# Patient Record
(demographics unavailable — no encounter records)

---

## 2024-10-21 NOTE — REASON FOR VISIT
Show Applicator Variable?: Yes Post-Care Instructions: I reviewed with the patient in detail post-care instructions. Patient is to wear sunprotection, and avoid picking at any of the treated lesions. Pt may apply Vaseline to crusted or scabbing areas.  May apply bandaid if desired. Duration Of Freeze Thaw-Cycle (Seconds): 0 Render Note In Bullet Format When Appropriate: No Application Tool (Optional): Liquid Nitrogen Sprayer Number Of Freeze-Thaw Cycles: 1 freeze-thaw cycle [Annual Wellness Visit] : an annual wellness visit Consent: The patient's consent was obtained including but not limited to risks of crusting, scabbing, blistering, scarring, darker or lighter pigmentary change, recurrence, incomplete removal and infection. Detail Level: Detailed

## 2024-10-23 NOTE — HISTORY OF PRESENT ILLNESS
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

## 2024-10-23 NOTE — HEALTH RISK ASSESSMENT
[With Significant Other] : lives with significant other [] :  [Fully functional (bathing, dressing, toileting, transferring, walking, feeding)] : Fully functional (bathing, dressing, toileting, transferring, walking, feeding) [Fully functional (using the telephone, shopping, preparing meals, housekeeping, doing laundry, using] : Fully functional and needs no help or supervision to perform IADLs (using the telephone, shopping, preparing meals, housekeeping, doing laundry, using transportation, managing medications and managing finances) [Never] : Never [NO] : No [With Patient/Caregiver] : , with patient/caregiver [Designated Healthcare Proxy] : Designated healthcare proxy [Name: ___] : Health Care Proxy's Name: [unfilled]  [Relationship: ___] : Relationship: [unfilled] [Good] : ~his/her~  mood as  good [Yes] : Yes [4 or more  times a week (4 pts)] : 4 or more  times a week (4 points) [1 or 2 (0 pts)] : 1 or 2 (0 points) [Never (0 pts)] : Never (0 points) [No] : In the past 12 months have you used drugs other than those required for medical reasons? No [No falls in past year] : Patient reported no falls in the past year [None] : None [Graduate School] : graduate school [Reports normal functional visual acuity (ie: able to read med bottle)] : Reports normal functional visual acuity [Audit-CScore] : 4 [Change in mental status noted] : No change in mental status noted [Reports changes in hearing] : Reports no changes in hearing [Reports changes in vision] : Reports no changes in vision [AdvancecareDate] : 10/23/2023

## 2024-10-23 NOTE — PHYSICAL EXAM
[No Acute Distress] : no acute distress [Well-Appearing] : well-appearing [Normal Sclera/Conjunctiva] : normal sclera/conjunctiva [No Respiratory Distress] : no respiratory distress  [Clear to Auscultation] : lungs were clear to auscultation bilaterally [Normal Rate] : normal rate  [Regular Rhythm] : with a regular rhythm [Soft] : abdomen soft [Non Tender] : non-tender

## 2024-10-23 NOTE — ASSESSMENT
[FreeTextEntry1] : 72 y/o male  hx Melanoma s/p excisions regular derm follow up  Afib: non valvular, paroxysmal Sinus today, rate normal DCCV in the past Xarelto 20 daily atenolol  Flecainide 150 bid Dr. Villanueva in past Upcoming appt with Cangello  HTN: near goal Amlodipine 2.5 (had been decreased from 5mg by Dr. Rangel at last visit) Atenolol 25mg bid - unusual choice  HLD: Atorva 20 LDL 85 in April    HCM: Metabolic screening today Hep C scree PSA agees Vaccines: Pneumococcal Vaccines completed eligible for Shingrix -  Recommended seasonal Flu , will get at pharmacy.   COVID vaccine in future CRC Screening: CLS in 8/2019 NOrmal, repeat in 2029

## 2024-10-23 NOTE — REVIEW OF SYSTEMS
[Dysuria] : no dysuria [Incontinence] : no incontinence [Hesitancy] : no hesitancy [Nocturia] : no nocturia [Frequency] : no frequency

## 2024-10-23 NOTE — HISTORY OF PRESENT ILLNESS
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

## 2024-10-28 NOTE — DISCUSSION/SUMMARY
[Patient] : the patient [EKG obtained to assist in diagnosis and management of assessed problem(s)] : EKG obtained to assist in diagnosis and management of assessed problem(s) [___ Month(s)] : in [unfilled] month(s) [FreeTextEntry1] : 72 y/o male with h/o htn, hl, afib s/p dccv, aflutter, overweight who presents for initial evaluation today  -continue norvasc - increase to 5 mg qd -continue asa, statin -continue xarelto -EP f/up for afib -continue atenolol, flecainide -CTA cor ordered given abnormal stress echo, on flecainide -labs 2024 reviewed, needs LpA -ekg ordered - SB, 1st avb, no st/t changes  -Stress Echo 2/24:  1. Normal exercise stress echocardiogram.  2. Abnormal exercise electrocardiography.  3. Positive ECG evidence of exercise ischemia at or near maximal predicted heart rate.  4. Negative for angina or the patient's characteristic chest pain.  5. Physiologic blood pressure response to exercise.  6. Good functional capacity.  7. Compared to the previous stress echocardiogram performed on 7/10/2020, there has been no significant interval changes in exercise capacity and imaging.  -Holter 2/24: SR, avg hr 57, 1st avb, rare pac's, pvc's -Echo 2022: normal lv/rv size/fxn, ef 60-65%, no wma, mildly dilated la, trace mr/tr/pr -counseled on cvd risk factors  -f/up 3 weeks for bp  I have spent 60 minutes reviewing labs, records, tests and discussed cvd risk factors, htn, hl

## 2024-10-28 NOTE — HISTORY OF PRESENT ILLNESS
[FreeTextEntry1] : 72 y/o male with h/o htn, hl, afib s/p dccv, aflutter, overweight who presents for initial evaluation today   no cp, sob, syncope, lh, palpitations, edema, orthopnea, pnd  -Stress Echo :  1. Normal exercise stress echocardiogram.  2. Abnormal exercise electrocardiography.  3. Positive ECG evidence of exercise ischemia at or near maximal predicted heart rate.  4. Negative for angina or the patient's characteristic chest pain.  5. Physiologic blood pressure response to exercise.  6. Good functional capacity.  7. Compared to the previous stress echocardiogram performed on 7/10/2020, there has been no significant interval changes in exercise capacity and imaging.  -Holter : SR, avg hr 57, 1st avb, rare pac's, pvc's -Echo : normal lv/rv size/fxn, ef 60-65%, no wma, mildly dilated la, trace mr/tr/pr  seen by Dr. Villanueva - on flecainide  walks at gym once/twice per week, plays squash diet healthy stress low sleeps 7 hours  PMH/PSH: htn hl 1st avb afib s/p dccv overweight melanoma removal  aflutter  ALL: pcn  MEDS: norvasc 2.5 mg qd atenolol 25 mg bid lipitor 20 mg qhs flecainide 150 mg bid xarelto 20 mg qd  SH: no tobacco social etoh no drugs  retired worked for [employer] no children from [place]  FH: mother - brain tumor,  60's father - AAA 70,  MVA 80 sister - alive, 70's, healthy

## 2024-12-02 NOTE — HISTORY OF PRESENT ILLNESS
[FreeTextEntry1] : 72 y/o male with h/o cad, htn, hl, afib s/p dccv, aflutter, overweight who presents for f/up today  last seen 10/24 norvasc increased to 5, lipitor increased to 40  -CTA cor : Cardiac: 1.  The calcium score is moderate at 167 Agatston units, which is at the 50 percentile, adjusted for age, gender and race. 2.  Minimal stenotic multivessel coronary artery disease. Non-cardiac: Partially imaged bilateral dependent subpleural groundglass and reticular opacities may reflect dependent atelectasis versus nonspecific interstitial lung abnormalities.  no cp, sob, syncope, lh, palpitations, edema, orthopnea, pnd  -Stress Echo : 1. Normal exercise stress echocardiogram. 2. Abnormal exercise electrocardiography. 3. Positive ECG evidence of exercise ischemia at or near maximal predicted heart rate. 4. Negative for angina or the patient's characteristic chest pain. 5. Physiologic blood pressure response to exercise. 6. Good functional capacity. 7. Compared to the previous stress echocardiogram performed on 7/10/2020, there has been no significant interval changes in exercise capacity and imaging.  -Holter : SR, avg hr 57, 1st avb, rare pac's, pvc's  -Echo : normal lv/rv size/fxn, ef 60-65%, no wma, mildly dilated la, trace mr/tr/pr  seen by Dr. Villanueva - on flecainide  walks at gym once/twice per week, plays squash diet healthy stress low sleeps 7 hours  PMH/PSH: htn hl 1st avb afib s/p dccv overweight melanoma removal aflutter cad  ALL: pcn  MEDS: norvasc 5 mg qd atenolol 25 mg bid lipitor 40 mg qhs flecainide 150 mg bid xarelto 20 mg qd  SH: no tobacco social etoh no drugs  retired worked for [employer] no children from [place]  FH: mother - brain tumor,  60's father - AAA 70,  MVA 80 sister - alive, 70's, healthy

## 2024-12-02 NOTE — DISCUSSION/SUMMARY
[Patient] : the patient [___ Month(s)] : in [unfilled] month(s) [FreeTextEntry1] : 72 y/o male with h/o cad, htn, hl, afib s/p dccv, aflutter, overweight who presents for initial evaluation today  -continue norvasc   -continue asa, statin -continue xarelto -EP f/up for afib, he should d/w EP flecainide given new diagnosis of CAD -continue atenolol, flecainide -CTA cor 11/24: Cardiac: 1.  The calcium score is moderate at 167 Agatston units, which is at the 50 percentile, adjusted for age, gender and race. 2.  Minimal stenotic multivessel coronary artery disease. Non-cardiac: Partially imaged bilateral dependent subpleural groundglass and reticular opacities may reflect dependent atelectasis versus nonspecific interstitial lung abnormalities. -pulm referral for lung changes on CTA cor -labs 2024 reviewed -ekg 10/24 - SB, 1st avb, no st/t changes -Stress Echo 2/24:  1. Normal exercise stress echocardiogram.  2. Abnormal exercise electrocardiography.  3. Positive ECG evidence of exercise ischemia at or near maximal predicted heart rate.  4. Negative for angina or the patient's characteristic chest pain.  5. Physiologic blood pressure response to exercise.  6. Good functional capacity.  7. Compared to the previous stress echocardiogram performed on 7/10/2020, there has been no significant interval changes in exercise capacity and imaging.  -Holter 2/24: SR, avg hr 57, 1st avb, rare pac's, pvc's -Echo 2022: normal lv/rv size/fxn, ef 60-65%, no wma, mildly dilated la, trace mr/tr/pr -counseled on cvd risk factors -f/up 3 months for lipids  I have spent 30 minutes reviewing labs, records, tests and discussed cvd risk factors, htn, hl.

## 2024-12-20 NOTE — PHYSICAL EXAM
[Well Developed] : well developed [Well Nourished] : well nourished [No Acute Distress] : no acute distress [Normal Venous Pressure] : normal venous pressure [No Carotid Bruit] : no carotid bruit [Normal S1, S2] : normal S1, S2 [No Murmur] : no murmur [No Rub] : no rub [No Gallop] : no gallop [Clear Lung Fields] : clear lung fields [Good Air Entry] : good air entry [No Respiratory Distress] : no respiratory distress  [Soft] : abdomen soft [Non Tender] : non-tender [No Masses/organomegaly] : no masses/organomegaly [Normal Bowel Sounds] : normal bowel sounds [Normal Gait] : normal gait [No Edema] : no edema [No Cyanosis] : no cyanosis [No Clubbing] : no clubbing [No Varicosities] : no varicosities [No Rash] : no rash [No Skin Lesions] : no skin lesions [Moves all extremities] : moves all extremities [No Focal Deficits] : no focal deficits [Normal Speech] : normal speech [Alert and Oriented] : alert and oriented [Normal memory] : normal memory [General Appearance - Well Developed] : well developed [Normal Appearance] : normal appearance [Well Groomed] : well groomed [General Appearance - Well Nourished] : well nourished [No Deformities] : no deformities [General Appearance - In No Acute Distress] : no acute distress [Normal Conjunctiva] : the conjunctiva exhibited no abnormalities [Eyelids - No Xanthelasma] : the eyelids demonstrated no xanthelasmas [Normal Oral Mucosa] : normal oral mucosa [No Oral Pallor] : no oral pallor [No Oral Cyanosis] : no oral cyanosis [Normal Jugular Venous A Waves Present] : normal jugular venous A waves present [Normal Jugular Venous V Waves Present] : normal jugular venous V waves present [No Jugular Venous Morillo A Waves] : no jugular venous morillo A waves [Respiration, Rhythm And Depth] : normal respiratory rhythm and effort [Exaggerated Use Of Accessory Muscles For Inspiration] : no accessory muscle use [Auscultation Breath Sounds / Voice Sounds] : lungs were clear to auscultation bilaterally [Heart Rate And Rhythm] : heart rate and rhythm were normal [Heart Sounds] : normal S1 and S2 [Murmurs] : no murmurs present [Abdomen Soft] : soft [Abdomen Tenderness] : non-tender [Abdomen Mass (___ Cm)] : no abdominal mass palpated [Abnormal Walk] : normal gait [Gait - Sufficient For Exercise Testing] : the gait was sufficient for exercise testing [Nail Clubbing] : no clubbing of the fingernails [Cyanosis, Localized] : no localized cyanosis [Petechial Hemorrhages (___cm)] : no petechial hemorrhages [Skin Color & Pigmentation] : normal skin color and pigmentation [] : no rash [No Venous Stasis] : no venous stasis [Skin Lesions] : no skin lesions [No Skin Ulcers] : no skin ulcer [No Xanthoma] : no  xanthoma was observed [Oriented To Time, Place, And Person] : oriented to person, place, and time [Affect] : the affect was normal [Mood] : the mood was normal [No Anxiety] : not feeling anxious

## 2024-12-20 NOTE — CARDIOLOGY SUMMARY
[___] : [unfilled] [LVEF ___%] : LVEF [unfilled]% [Normal] : normal LA size [None] : no mitral regurgitation [de-identified] : 12/20/24: sinsu bradycardia @ 59 bpm  8/8/23 SB with first deg AVB  7/19/22 SB Jeffrey = 274  [de-identified] : 7/10/20 Normal structure and function

## 2024-12-20 NOTE — HISTORY OF PRESENT ILLNESS
[FreeTextEntry1] : 72 y/o M, with PMHX of atrial fibrillation s/p DCCV 6 years ago and started on Flecainide/ASA, lucrecia, who presents from Dr. Brandt's office to discuss flecainide with his positive CTA.  He generally feels well overall. He checks his pulse and does not feel he has been in afib recently. He denies chest pain, SOB, VILLANUEVA, palpitation, light headedness, dizziness, syncope or change in exertional capacity.

## 2024-12-20 NOTE — DISCUSSION/SUMMARY
[EKG obtained to assist in diagnosis and management of assessed problem(s)] : EKG obtained to assist in diagnosis and management of assessed problem(s) [FreeTextEntry1] : 70 y/o M, with PMHX of atrial fibrillation s/p DCCV 6 years ago and started on Flecainide/ASA, lucrecia, who presents from Dr. Brandt's office to discuss flecainide with his positive CTA.   1) Atrial Fibrillation  - He is maintaining NSR. He has been on flecainide for the past few years - Given CAD on CTA, we catherine switch him to multaq 400 mg twice daily.  - He should continue his atenolol 25 mg twice daily.  - Advised that an ablation remains an option if he has difficulty maintaining NSR without flecainide.   2) Stroke Risk  -  -CHADsVasc score of 3 - Continue Eliquis 5 mg twice daily - Tolerating well, no signs of bleeding  Patient to f/u in 6 months or sooner if he has any questions or concerns

## 2024-12-20 NOTE — ADDENDUM
[FreeTextEntry1] :   I, Franchesca Casarez, am scribing for and the presence of Dr. Fiorella Ramos the following sections: HPI, PMH,Family/social history, ROS, Physical Exam, Assessment / Plan.  I, Fiorella Ramos, hereby attest that the medical record entry for this patient accurately reflects signatures/notations that I made on the Date of Service in my capacity as an Attending Physician when I treated/diagnosed the above patient. I do hereby attest that this information is true, accurate and complete to the best of my knowledge.  I was present for the entire visit and supervised the entire visit including assessing the patient, conducting exam and establishing the plan of care.  I personally performed the evaluation and management services and agree with the plan as outlined above.

## 2025-02-18 NOTE — HISTORY OF PRESENT ILLNESS
[Never] : never [TextBox_4] : 71 year old male comes in for initial consultation: Pt has h/o Afib 15 yrs ago. Well controlled. In Nov 2024, he had a coronary CT done. No dyspnea on exertion. He is able to climb 4-6 flights of stairs currently. Exercises daily 45 min. Exercises are light.  No cough, no wheezing, no nocturnal awakening. H/O snoring as per patient's wife. Never a cigarette smoker. Smoked cigars 2-3/week for 5-6 yrs. 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 His father was a lifetime smoker [TextBox_29] : cigar smoking history

## 2025-02-18 NOTE — PROCEDURE
[FreeTextEntry1] : Coronary CT 11/18/24  IMPRESSION: Cardiac: 1. The calcium score is moderate at 167 Agatston units, which is at the 50 percentile, adjusted for age, gender and race. 2. Minimal stenotic multivessel coronary artery disease. Non-cardiac: Partially imaged bilateral dependent subpleural groundglass and reticular opacities may reflect dependent atelectasis versus nonspecific interstitial lung abnormalities.

## 2025-02-18 NOTE — ASSESSMENT
[FreeTextEntry1] : 2-18-25  It was a pleasure meeting Kenneth in consultation today. His respiratory issues are summarized:  1. Ground glass opacities Coronary CT done on 11/18/24 shows some of the airways are dilated and thickened. There is a focal ground glass opacity in the lingular segment, bronchocentric, in the subpleural area. There is subpleural ground glass opacity with some minimal reticulation seen in the left lower lobe close the fissure.  the lungs are only partially viewed so the ground glass opacities appear to be more on the left side, which we visualize more on this type of CT. We need to get a dedicated chest CT to evaluate the entire lung fields and determine if this would fit the criteria for interstitial lung abnormality. He denies joint stiffness, family history of collagen vascular disease Even though he was close to the [facility] during the time of collapse, he was not under the cloud and did not return for 1 year. This would not be classified as inhalational exposure.   Plan: We will check autoimmune serologies to evaluate for collagen vascular disease PFT, 6MWT to evaluate baseline lung function and SpO2 while he is walking Dedicated chest CT w/o contrast  2. Tracheobronchomalacia It is very likely based upon the triangular shape of the trachea. He has no symptoms, can walk 5-6 flights of stairs without getting short of breath. However, we would like to get a dynamic CT to evaluate for tracheobronchomalacia.  3. Evaluate for obstructive sleep apnea He has a cardiac history, atrial fibrillation. We will order an in-lab diagnostic sleep study.  Return to clinic: 3 months

## 2025-02-18 NOTE — PHYSICAL EXAM
[TextBox_68] : good air entry bilaterally, no wheezes or stridor. Harsh breath sounds upon exhalation [TextBox_105] : 1+ pitting edema, equal bilaterally

## 2025-02-18 NOTE — ADDENDUM
[FreeTextEntry1] :    I, Dr. Emmett Sierra, personally performed the evaluation and management services for this patient who presents  today as a new consultative visit. I personally performed the services described in the documentation, reviewed the documentation recorded by the scribe in my presence and it accurately and completely records my words and actions     Ms. Keyla Bahena acted as a scribe in writing the note that was dictated by me.           I spent  a total of   60 minutes evaluating the patient today. This includes spending 10 min on reviewing the patient's serial performed investigations, discussing today's PFTs, the causes of symptoms, the need to get further investigations, shared decision making regarding follow up.     The remaining time was spent with the patient in discussing health maintenance, benefits of an active life style and avoidance of inhalational exposure     This time does not include performance of any procedures such as PFTs

## 2025-02-18 NOTE — ASSESSMENT
[FreeTextEntry1] : 2-18-25  It was a pleasure meeting Kenneth in consultation today. His respiratory issues are summarized:  1. Ground glass opacities Coronary CT done on 11/18/24 shows some of the airways are dilated and thickened. There is a focal ground glass opacity in the lingular segment, bronchocentric, in the subpleural area. There is subpleural ground glass opacity with some minimal reticulation seen in the left lower lobe close the fissure.  the lungs are only partially viewed so the ground glass opacities appear to be more on the left side, which we visualize more on this type of CT. We need to get a dedicated chest CT to evaluate the entire lung fields and determine if this would fit the criteria for interstitial lung abnormality. He denies joint stiffness, family history of collagen vascular disease Even though he was close to the [facility] during the time of collapse, he was not under the cloud and did not return for 1 year. This would not be classified as inhalational exposure.   Plan: We will check autoimmune serologies to evaluate for collagen vascular disease PFT, 6MWT to evaluate baseline lung function and SpO2 while he is walking Dedicated chest CT w/o contrast  2. Tracheobronchomalacia It is very likely based upon the triangular shape of the trachea. He has no symptoms, can walk 5-6 flights of stairs without getting short of breath. However, we would like to get a dynamic CT to evaluate for tracheobronchomalacia.  3. Evaluate for obstructive sleep apnea He has a cardiac history, atrial fibrillation. We will order an in-lab diagnostic sleep study.  Return to clinic: 3 months

## 2025-02-18 NOTE — ASSESSMENT
[FreeTextEntry1] : 2-18-25  It was a pleasure meeting Kenneth in consultation today. His respiratory issues are summarized:  1. Ground glass opacities Coronary CT done on 11/18/24 shows some of the airways are dilated and thickened. There is a focal ground glass opacity in the lingular segment, bronchocentric, in the subpleural area. There is subpleural ground glass opacity with some minimal reticulation seen in the left lower lobe close the fissure.  the lungs are only partially viewed so the ground glass opacities appear to be more on the left side, which we visualize more on this type of CT. We need to get a dedicated chest CT to evaluate the entire lung fields and determine if this would fit the criteria for interstitial lung abnormality. He denies joint stiffness, family history of collagen vascular disease Even though he was close to the [facility] during the time of collapse, he was not under the cloud and did not return for 1 year. This would not be classified as inhalational exposure.   Plan: We will check autoimmune serologies to evaluate for collagen vascular disease PFT, 6MWT to evaluate baseline lung function and SpO2 while he is walking Dedicated chest CT w/o contrast  2. Tracheobronchomalacia It is very likely based upon the triangular shape of the trachea. He has no symptoms, can walk 5-6 flights of stairs without getting short of breath. However, we would like to get a dynamic CT to evaluate for tracheobronchomalacia.  3. Evaluate for obstructive sleep apnea He has a cardiac history, atrial fibrillation. We will order an in-lab diagnostic sleep study.  Return to clinic: 3 months

## 2025-02-18 NOTE — DISCUSSION/SUMMARY
[FreeTextEntry1] : Attending's Summary: 2-18-25 - no pallor, no icterus Collar size 16.5 - no supraclavicular adenopathy - no clubbing, no skin thickening, no palmar erythema, skin is dry, minimal fissures of the fingers, or articular manifestations of arthritic disease - good radial pulses - no JVD, no hepatojugular reflux in the sitting position - no clinically detected hepatosplenomegaly - good air entry bilaterally, no wheezes or stridor. Harsh breath sounds upon exhalation - normal s1/s2, p2 neither loud nor split - 1+ pitting edema, equal bilaterally

## 2025-03-10 NOTE — DISCUSSION/SUMMARY
[Patient] : the patient [EKG obtained to assist in diagnosis and management of assessed problem(s)] : EKG obtained to assist in diagnosis and management of assessed problem(s) [___ Month(s)] : in [unfilled] month(s) [FreeTextEntry1] : 70 y/o male with h/o cad, htn, hl, afib s/p dccv, aflutter, overweight who presents for initial evaluation today  -pulm f/up  -continue norvasc - increase to 10 mg  -continue asa, statin -continue xarelto -EP f/up for afib  -continue atenolol, multaq -CTA cor 11/24: Cardiac: 1. The calcium score is moderate at 167 Agatston units, which is at the 50 percentile, adjusted for age, gender and race. 2. Minimal stenotic multivessel coronary artery disease. Non-cardiac: Partially imaged bilateral dependent subpleural groundglass and reticular opacities may reflect dependent atelectasis versus nonspecific interstitial lung abnormalities.  -labs 2024/2025 reviewed, lipids ordered  -LpA wnl -ekg 12/24 reviewed -ekg ordered today - SB, 1st avb, no st/t changes -Stress Echo 2/24:  1. Normal exercise stress echocardiogram.  2. Abnormal exercise electrocardiography.  3. Positive ECG evidence of exercise ischemia at or near maximal predicted heart rate.  4. Negative for angina or the patient's characteristic chest pain.  5. Physiologic blood pressure response to exercise.  6. Good functional capacity.  7. Compared to the previous stress echocardiogram performed on 7/10/2020, there has been no significant interval changes in exercise capacity and imaging.  -Holter 2/24: SR, avg hr 57, 1st avb, rare pac's, pvc's -Echo 2022: normal lv/rv size/fxn, ef 60-65%, no wma, mildly dilated la, trace mr/tr/pr -counseled on cvd risk factors -f/up 3 weeks for bp  I have spent 30 minutes reviewing labs, records, tests and discussed cvd risk factors, htn, hl.

## 2025-03-10 NOTE — HISTORY OF PRESENT ILLNESS
[FreeTextEntry1] : 70 y/o male with h/o cad, htn, hl, afib s/p dccv, aflutter, overweight who presents for f/up today  last seen    seen by EP - flecainisanjiv florez'bunny, multaq started  seen by pulmonary   -CTA cor : Cardiac: 1. The calcium score is moderate at 167 Agatston units, which is at the 50 percentile, adjusted for age, gender and race. 2. Minimal stenotic multivessel coronary artery disease. Non-cardiac: Partially imaged bilateral dependent subpleural groundglass and reticular opacities may reflect dependent atelectasis versus nonspecific interstitial lung abnormalities.  no cp, sob, syncope, lh, palpitations, edema, orthopnea, pnd  -Stress Echo : 1. Normal exercise stress echocardiogram. 2. Abnormal exercise electrocardiography. 3. Positive ECG evidence of exercise ischemia at or near maximal predicted heart rate. 4. Negative for angina or the patient's characteristic chest pain. 5. Physiologic blood pressure response to exercise. 6. Good functional capacity. 7. Compared to the previous stress echocardiogram performed on 7/10/2020, there has been no significant interval changes in exercise capacity and imaging.  -Holter : SR, avg hr 57, 1st avb, rare pac's, pvc's  -Echo : normal lv/rv size/fxn, ef 60-65%, no wma, mildly dilated la, trace mr/tr/pr     walks at gym once/twice per week, plays squash diet healthy stress low sleeps 7 hours  PMH/PSH: htn hl 1st avb afib s/p dccv overweight melanoma removal aflutter cad  ALL: pcn  MEDS: norvasc 5 mg qd atenolol 25 mg bid lipitor 40 mg qhs multaq 400 mg bid xarelto 20 mg qd  SH: no tobacco social etoh no drugs  retired worked for [employer] no children from [place]  FH: mother - brain tumor,  60's father - AAA 70,  MVA 80 sister - alive, 70's, healthy

## 2025-04-07 NOTE — DISCUSSION/SUMMARY
[FreeTextEntry1] : 72 y/o M, with PMHX of atrial fibrillation s/p DCCV 6 years ago and started on Flecainide/ASA, lucrecia, who presents from Dr. Brandt's office to discuss flecainide with his positive CTA.   1) Atrial Fibrillation  - He is maintaining NSR. He has been on flecainide for the past few years - Given CAD on CTA, we catherine switch him to multaq 400 mg twice daily.  - He should continue his atenolol 25 mg twice daily.  - Advised that an ablation remains an option if he has difficulty maintaining NSR without flecainide.   2) Stroke Risk  -  -CHADsVasc score of 3 - Continue Eliquis 5 mg twice daily - Tolerating well, no signs of bleeding  Patient to f/u in 6 months or sooner if he has any questions or concerns

## 2025-04-07 NOTE — PHYSICAL EXAM
[Well Developed] : well developed [Well Nourished] : well nourished [No Acute Distress] : no acute distress [Normal Venous Pressure] : normal venous pressure [No Carotid Bruit] : no carotid bruit [Normal S1, S2] : normal S1, S2 [No Murmur] : no murmur [No Rub] : no rub [No Gallop] : no gallop [Clear Lung Fields] : clear lung fields [Good Air Entry] : good air entry [No Respiratory Distress] : no respiratory distress  [Soft] : abdomen soft [Non Tender] : non-tender [No Masses/organomegaly] : no masses/organomegaly [Normal Bowel Sounds] : normal bowel sounds [Normal Gait] : normal gait [No Edema] : no edema [No Cyanosis] : no cyanosis Regular Diet - No restrictions/Soft and Bite-Sized Diet [No Clubbing] : no clubbing [No Varicosities] : no varicosities [No Rash] : no rash [No Skin Lesions] : no skin lesions [Moves all extremities] : moves all extremities [No Focal Deficits] : no focal deficits [Normal Speech] : normal speech [Alert and Oriented] : alert and oriented [Normal memory] : normal memory [General Appearance - Well Developed] : well developed [Normal Appearance] : normal appearance [Well Groomed] : well groomed [General Appearance - Well Nourished] : well nourished [No Deformities] : no deformities [General Appearance - In No Acute Distress] : no acute distress [Normal Conjunctiva] : the conjunctiva exhibited no abnormalities [Eyelids - No Xanthelasma] : the eyelids demonstrated no xanthelasmas [Normal Oral Mucosa] : normal oral mucosa [No Oral Pallor] : no oral pallor [No Oral Cyanosis] : no oral cyanosis [Normal Jugular Venous A Waves Present] : normal jugular venous A waves present [Normal Jugular Venous V Waves Present] : normal jugular venous V waves present [No Jugular Venous Morillo A Waves] : no jugular venous morillo A waves [Respiration, Rhythm And Depth] : normal respiratory rhythm and effort [Exaggerated Use Of Accessory Muscles For Inspiration] : no accessory muscle use [Auscultation Breath Sounds / Voice Sounds] : lungs were clear to auscultation bilaterally [Heart Rate And Rhythm] : heart rate and rhythm were normal [Heart Sounds] : normal S1 and S2 [Murmurs] : no murmurs present [Abdomen Soft] : soft [Abdomen Tenderness] : non-tender [Abdomen Mass (___ Cm)] : no abdominal mass palpated [Abnormal Walk] : normal gait [Gait - Sufficient For Exercise Testing] : the gait was sufficient for exercise testing [Nail Clubbing] : no clubbing of the fingernails [Cyanosis, Localized] : no localized cyanosis [Petechial Hemorrhages (___cm)] : no petechial hemorrhages [Skin Color & Pigmentation] : normal skin color and pigmentation [] : no rash [No Venous Stasis] : no venous stasis [Skin Lesions] : no skin lesions [No Skin Ulcers] : no skin ulcer [No Xanthoma] : no  xanthoma was observed [Oriented To Time, Place, And Person] : oriented to person, place, and time [Affect] : the affect was normal [Mood] : the mood was normal [No Anxiety] : not feeling anxious

## 2025-04-07 NOTE — HISTORY OF PRESENT ILLNESS
[FreeTextEntry1] : 72 y/o M, with PMHX of atrial fibrillation s/p DCCV 6 years ago and started on Flecainide/ASA, lucrecia, who presents from Dr. Brandt's office to discuss flecainide with his positive CTA.  Given CAD on CTA, we catherine switch him to multaq 400 mg twice daily.     He generally feels well overall. He checks his pulse and does not feel he has been in afib recently. He denies chest pain, SOB, VILLANUEVA, palpitation, light headedness, dizziness, syncope or change in exertional capacity.  No bleeding issues on Xarelto.   He notes a < 10% burden of AFib on Apple watch, usually during the hours of 12-4AM.    He is walking  and biking 6-7 days per week.  Sleep study 2/24/25 significant for up to mild sleep disordered breathing.

## 2025-04-07 NOTE — CARDIOLOGY SUMMARY
[___] : [unfilled] [LVEF ___%] : LVEF [unfilled]% [Normal] : normal LA size [None] : no mitral regurgitation [de-identified] : 12/20/24: sinsu bradycardia @ 59 bpm  8/8/23 SB with first deg AVB  7/19/22 SB Jeffrey = 274  [de-identified] : 7/10/20 Normal structure and function

## 2025-04-07 NOTE — CARDIOLOGY SUMMARY
[___] : [unfilled] [LVEF ___%] : LVEF [unfilled]% [Normal] : normal LA size [None] : no mitral regurgitation [de-identified] : 12/20/24: sinsu bradycardia @ 59 bpm  8/8/23 SB with first deg AVB  7/19/22 SB Jeffrey = 274  [de-identified] : 7/10/20 Normal structure and function

## 2025-04-07 NOTE — DISCUSSION/SUMMARY
[FreeTextEntry1] : 70 y/o M, with PMHX of atrial fibrillation s/p DCCV 6 years ago and started on Flecainide/ASA, lucrecia, who presents from Dr. Brandt's office to discuss flecainide with his positive CTA.   1) Atrial Fibrillation  - He is maintaining NSR. He has been on flecainide for the past few years - Given CAD on CTA, we catherine switch him to multaq 400 mg twice daily.  - He should continue his atenolol 25 mg twice daily.  - Advised that an ablation remains an option if he has difficulty maintaining NSR without flecainide.   2) Stroke Risk  -  -CHADsVasc score of 3 - Continue Eliquis 5 mg twice daily - Tolerating well, no signs of bleeding  Patient to f/u in 6 months or sooner if he has any questions or concerns

## 2025-04-07 NOTE — CARDIOLOGY SUMMARY
[___] : [unfilled] [LVEF ___%] : LVEF [unfilled]% [Normal] : normal LA size [None] : no mitral regurgitation [de-identified] : 12/20/24: sinsu bradycardia @ 59 bpm  8/8/23 SB with first deg AVB  7/19/22 SB Jeffrey = 274  [de-identified] : 7/10/20 Normal structure and function

## 2025-04-09 NOTE — PHYSICAL EXAM
[No Acute Distress] : no acute distress [Well-Appearing] : well-appearing [Normal Sclera/Conjunctiva] : normal sclera/conjunctiva [No Respiratory Distress] : no respiratory distress  [Clear to Auscultation] : lungs were clear to auscultation bilaterally [Normal Rate] : normal rate  [Regular Rhythm] : with a regular rhythm [de-identified] : 1+ edema, pretibial/ankle, b/l

## 2025-04-09 NOTE — ASSESSMENT
[FreeTextEntry1] : 72 y/o male  Pulm: Lung abnormalities see on Cardiac CT, seen by Raoof Repeat lung CT AIRWAYS AND LUNGS: The central tracheobronchial tree is patent.  Greater than 70%. Bronchial narrowing with dynamic expiration. No abnormal tracheobronchial narrowing with dynamic expiration. Peripheral reticulation and ground glass opacity with mild bronchiectasis. Right lower lobe subpleural 8 mm nodule versus lymph node (5, 170) and PFTs done Extensive testing reviewed Sleep study without CAMELIA, PFTs Normal 6 minute walks test normal for age Repeat CT in June with follow up Raoof in June as well  hx Melanoma s/p excisions regular derm follow up, last 3-4 weeks ago  Afib: non valvular, paroxysmal, DCCV in the past Low afib burden as per apple watch Sinus today, rate normal Sees Richard, notes review Xarelto 20 daily atenolol 25mg bid Multaq 400 bid    CV Risk: Sees Cangello Calcium Score 167 Is not on ASA at this time, will clarify need with Cangello Statin: Atorva 40 LDL at 59 March 2025   HTN: at goal Amlodipine now at 10mg, does have some ankle/eg edema but is not bothering him, not interested in a med class change at this time, will monitor and let us know if this changes Atenolol 25mg bid (also for fib    HCM: Pneumococcal Vaccines completed CRC Screening: CLS in 8/2019 NOrmal, repeat in 2029

## 2025-04-09 NOTE — HISTORY OF PRESENT ILLNESS
[de-identified] : 72 y/o male for f/u  recent hx and notes reviewed  No pulm symptoms wearing an apple watch now Started walking up 8 flights of stairs in prep for his 6MW and now doing this regularly without stopping Does have some leg swelling with amlodipine 10mg but is not botherin him

## 2025-04-09 NOTE — HISTORY OF PRESENT ILLNESS
[de-identified] : 72 y/o male for f/u  recent hx and notes reviewed  No pulm symptoms wearing an apple watch now Started walking up 8 flights of stairs in prep for his 6MW and now doing this regularly without stopping Does have some leg swelling with amlodipine 10mg but is not botherin him

## 2025-04-09 NOTE — PHYSICAL EXAM
[No Acute Distress] : no acute distress [Well-Appearing] : well-appearing [Normal Sclera/Conjunctiva] : normal sclera/conjunctiva [No Respiratory Distress] : no respiratory distress  [Clear to Auscultation] : lungs were clear to auscultation bilaterally [Normal Rate] : normal rate  [Regular Rhythm] : with a regular rhythm [de-identified] : 1+ edema, pretibial/ankle, b/l

## 2025-04-21 NOTE — DISCUSSION/SUMMARY
[Patient] : the patient [___ Month(s)] : in [unfilled] month(s) [FreeTextEntry1] : 70 y/o male with h/o cad, htn, hl, afib s/p dccv, aflutter, overweight who presents for initial evaluation today  -pulm f/up -continue norvasc - lower to 5 mg due to le edema on 10 mg dose -start losartan 25 mg qd -continue asa, statin -continue xarelto -pulm f/up for CT changes lungs -EP f/up for afib -continue atenolol, multaq -CTA cor 11/24: Cardiac: 1. The calcium score is moderate at 167 Agatston units, which is at the 50 percentile, adjusted for age, gender and race. 2. Minimal stenotic multivessel coronary artery disease. Non-cardiac: Partially imaged bilateral dependent subpleural groundglass and reticular opacities may reflect dependent atelectasis versus nonspecific interstitial lung abnormalities.  -labs 2024/2025 reviewed  -LpA wnl -ekg 12/24 reviewed -ekg 3/25 - SB, 1st avb, no st/t changes -Stress Echo 2/24:  1. Normal exercise stress echocardiogram.  2. Abnormal exercise electrocardiography.  3. Positive ECG evidence of exercise ischemia at or near maximal predicted heart rate.  4. Negative for angina or the patient's characteristic chest pain.  5. Physiologic blood pressure response to exercise.  6. Good functional capacity.  7. Compared to the previous stress echocardiogram performed on 7/10/2020, there has been no significant interval changes in exercise capacity and imaging.  -Holter 2/24: SR, avg hr 57, 1st avb, rare pac's, pvc's -Echo 2022: normal lv/rv size/fxn, ef 60-65%, no wma, mildly dilated la, trace mr/tr/pr -counseled on cvd risk factors -f/up 3 weeks for bp, bmp  I have spent 30 minutes reviewing labs, records, tests and discussed cvd risk factors, htn, hl.

## 2025-04-21 NOTE — HISTORY OF PRESENT ILLNESS
[FreeTextEntry1] : 72 y/o male with h/o cad, htn, hl, afib s/p dccv, aflutter, overweight who presents for f/up today  last seen 3/25 norvasc increased to 10 - notes le edema, feels sluggish, bloated  seen by pulm for lung changes on CT  seen by EP - on multaq    -CTA cor : Cardiac: 1. The calcium score is moderate at 167 Agatston units, which is at the 50 percentile, adjusted for age, gender and race. 2. Minimal stenotic multivessel coronary artery disease. Non-cardiac: Partially imaged bilateral dependent subpleural groundglass and reticular opacities may reflect dependent atelectasis versus nonspecific interstitial lung abnormalities.  no cp, sob, syncope, lh, palpitations, edema, orthopnea, pnd  -Stress Echo : 1. Normal exercise stress echocardiogram. 2. Abnormal exercise electrocardiography. 3. Positive ECG evidence of exercise ischemia at or near maximal predicted heart rate. 4. Negative for angina or the patient's characteristic chest pain. 5. Physiologic blood pressure response to exercise. 6. Good functional capacity. 7. Compared to the previous stress echocardiogram performed on 7/10/2020, there has been no significant interval changes in exercise capacity and imaging.  -Holter : SR, avg hr 57, 1st avb, rare pac's, pvc's  -Echo : normal lv/rv size/fxn, ef 60-65%, no wma, mildly dilated la, trace mr/tr/pr    walks at gym once/twice per week, plays squash diet healthy stress low sleeps 7 hours  PMH/PSH: htn hl 1st avb afib s/p dccv overweight melanoma removal aflutter cad  ALL: pcn  MEDS: norvasc 10 mg qd atenolol 25 mg bid lipitor 40 mg qhs multaq 400 mg bid xarelto 20 mg qd  SH: no tobacco social etoh no drugs  retired worked for [employer] no children from [place]  FH: mother - brain tumor,  60's father - AAA 70,  MVA 80 sister - alive, 70's, healthy

## 2025-06-02 NOTE — DISCUSSION/SUMMARY
[Patient] : the patient [___ Month(s)] : in [unfilled] month(s) [FreeTextEntry1] : 72 y/o male with h/o cad, htn, hl, afib s/p dccv, aflutter, overweight who presents for initial evaluation today  -pulm f/up -continue norvasc (had edema on 10 mg dose) -continue losartan 25 mg qd -continue asa, statin -continue xarelto -pulm f/up for CT changes lungs -EP f/up for afib -continue atenolol, multaq -CTA cor 11/24: Cardiac: 1. The calcium score is moderate at 167 Agatston units, which is at the 50 percentile, adjusted for age, gender and race. 2. Minimal stenotic multivessel coronary artery disease. Non-cardiac: Partially imaged bilateral dependent subpleural groundglass and reticular opacities may reflect dependent atelectasis versus nonspecific interstitial lung abnormalities.  -labs 2024/2025 reviewed, bmp ordered today -LpA wnl -ekg 12/24 reviewed -ekg 3/25 - SB, 1st avb, no st/t changes -Stress Echo 2/24:  1. Normal exercise stress echocardiogram.  2. Abnormal exercise electrocardiography.  3. Positive ECG evidence of exercise ischemia at or near maximal predicted heart rate.  4. Negative for angina or the patient's characteristic chest pain.  5. Physiologic blood pressure response to exercise.  6. Good functional capacity.  7. Compared to the previous stress echocardiogram performed on 7/10/2020, there has been no significant interval changes in exercise capacity and imaging.  -Holter 2/24: SR, avg hr 57, 1st avb, rare pac's, pvc's -Echo 2022: normal lv/rv size/fxn, ef 60-65%, no wma, mildly dilated la, trace mr/tr/pr -counseled on cvd risk factors -f/up 6 months for cvd risk factors  I have spent 30 minutes reviewing labs, records, tests and discussed cvd risk factors, htn, hl.

## 2025-06-02 NOTE — HISTORY OF PRESENT ILLNESS
[FreeTextEntry1] : 70 y/o male with h/o cad, htn, hl, afib s/p dccv, aflutter, overweight who presents for f/up today  last seen  norvasc lowered to 5 mg with now improved le edema losartan 25 started    seen by pulm for lung changes on CT  seen by EP - on multaq   -CTA cor : Cardiac: 1. The calcium score is moderate at 167 Agatston units, which is at the 50 percentile, adjusted for age, gender and race. 2. Minimal stenotic multivessel coronary artery disease. Non-cardiac: Partially imaged bilateral dependent subpleural groundglass and reticular opacities may reflect dependent atelectasis versus nonspecific interstitial lung abnormalities.  no cp, sob, syncope, lh, palpitations, edema, orthopnea, pnd  -Stress Echo : 1. Normal exercise stress echocardiogram. 2. Abnormal exercise electrocardiography. 3. Positive ECG evidence of exercise ischemia at or near maximal predicted heart rate. 4. Negative for angina or the patient's characteristic chest pain. 5. Physiologic blood pressure response to exercise. 6. Good functional capacity. 7. Compared to the previous stress echocardiogram performed on 7/10/2020, there has been no significant interval changes in exercise capacity and imaging.  -Holter : SR, avg hr 57, 1st avb, rare pac's, pvc's  -Echo : normal lv/rv size/fxn, ef 60-65%, no wma, mildly dilated la, trace mr/tr/pr    walks at gym once/twice per week, plays squash diet healthy stress low sleeps 7 hours  PMH/PSH: htn hl 1st avb afib s/p dccv overweight melanoma removal aflutter cad  ALL: pcn  MEDS: norvasc 5 mg qd atenolol 25 mg bid lipitor 40 mg qhs multaq 400 mg bid xarelto 20 mg qd losartan 25 mg qd  SH: no tobacco social etoh no drugs  retired worked for [employer] no children from [place]  FH: mother - brain tumor,  60's father - AAA 70,  MVA 80 sister - alive, 70's, healthy

## 2025-06-17 NOTE — HISTORY OF PRESENT ILLNESS
[Never] : never [TextBox_4] : 72 year old male comes in for initial consultation: Pt has h/o Afib 15 yrs ago. Well controlled. In Nov 2024, he had a coronary CT done. No dyspnea on exertion. He is able to climb 4-6 flights of stairs currently. Exercises daily 45 min. Exercises are light.  No cough, no wheezing, no nocturnal awakening. H/O snoring as per patient's wife. Never a cigarette smoker. Smoked cigars 2-3/week for 5-6 yrs. 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 His father was a lifetime smoker   6-17-25 Patient is here for follow up, to review CT scan done on 6/4/25. Reports no changes. [TextBox_29] : cigar smoking history

## 2025-06-17 NOTE — REASON FOR VISIT
[Initial] : an initial visit [Abnormal CXR/ Chest CT] : an abnormal CXR/ chest CT [TextBox_13] : Dr. Kristie Rivas

## 2025-06-17 NOTE — PHYSICAL EXAM
[No Acute Distress] : no acute distress [Normal Oropharynx] : normal oropharynx [Normal Appearance] : normal appearance [No Neck Mass] : no neck mass [Normal Rate/Rhythm] : normal rate/rhythm [Normal S1, S2] : normal s1, s2 [No Murmurs] : no murmurs [No Resp Distress] : no resp distress [Benign] : benign [No Abnormalities] : no abnormalities [Normal Gait] : normal gait [No Clubbing] : no clubbing [No Cyanosis] : no cyanosis [FROM] : FROM [Normal Color/ Pigmentation] : normal color/ pigmentation [No Focal Deficits] : no focal deficits [Oriented x3] : oriented x3 [Normal Affect] : normal affect [TextBox_68] : good air entry bilaterally, no wheezes or stridor. Harsh breath sounds upon exhalation [TextBox_105] : 1+ pitting edema, equal bilaterally

## 2025-06-17 NOTE — DISCUSSION/SUMMARY
[FreeTextEntry1] : Attending's Summary: 6-17-25 - no pallor, no icterus -Collar size 16.5 - no supraclavicular adenopathy - no clubbing, no skin thickening, no palmar erythema, skin is dry, minimal fissures of the fingers, or articular manifestations of arthritic disease - good radial pulses - no JVD, no hepatojugular reflux in the sitting position - no clinically detected hepatosplenomegaly, no abdominal tenderness - good air entry bilaterally, no wheezes or stridor. Harsh breath sounds upon exhalation - normal s1/s2, p2 neither loud nor split - 1+ pitting edema, equal bilaterally -no cords

## 2025-06-17 NOTE — PROCEDURE
[FreeTextEntry1] : CT of chest 6/4/25  IMPRESSION:  Stable right lower lobe nodule with benign morphology as discussed. If there is a significant smoking history would suggest additional CT follow-up in 12 months.   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 This can also occur in the setting of chronic recurrent acid aspiration or sleep apnea. There is no fibrosis.   Coronary CT 11/18/24  IMPRESSION: Cardiac: 1. The calcium score is moderate at 167 Agatston units, which is at the 50 percentile, adjusted for age, gender and race. 2. Minimal stenotic multivessel coronary artery disease. Non-cardiac: Partially imaged bilateral dependent subpleural groundglass and reticular opacities may reflect dependent atelectasis versus nonspecific interstitial lung abnormalities.

## 2025-06-17 NOTE — ADDENDUM
[FreeTextEntry1] :  I, Dr. Emmett Sierra, personally performed the evaluation and management (E/M) services for this established patient who presents today with no new symptoms     I personally performed the services described in the documentation, reviewed the documentation recorded by the scribe in my presence and it accurately and completely records my words and actions      Ms. Keyla Josef and Ms. Kirsten Violet acted as a scribes in writing the note that was dictated by me.     I spent  a total of 35 min minutes evaluating the patient today. This includes spending 10 min on reviewing the patient's clinical history, serial CT scans and PFT's before the patients visit     The remaining time was spent with the patient in showing prior CT scan and comparing them with the current images. We discussed the pulmonary symptoms and the most likely causes of these symptoms. We discussed the sequence of investigations and the possible treatment strategy.  His questions were satisfactorily answered.     This time does not include performance of any procedures such as PFT or any teaching

## 2025-06-17 NOTE — PHYSICAL EXAM
[No Acute Distress] : no acute distress [Normal Oropharynx] : normal oropharynx [Normal Appearance] : normal appearance [No Neck Mass] : no neck mass [Normal Rate/Rhythm] : normal rate/rhythm [No Murmurs] : no murmurs [Normal S1, S2] : normal s1, s2 [No Resp Distress] : no resp distress [Benign] : benign [No Abnormalities] : no abnormalities [Normal Gait] : normal gait [No Clubbing] : no clubbing [No Cyanosis] : no cyanosis [FROM] : FROM [Normal Color/ Pigmentation] : normal color/ pigmentation [No Focal Deficits] : no focal deficits [Oriented x3] : oriented x3 [Normal Affect] : normal affect [TextBox_68] : good air entry bilaterally, no wheezes or stridor. Harsh breath sounds upon exhalation [TextBox_105] : 1+ pitting edema, equal bilaterally

## 2025-06-17 NOTE — ASSESSMENT
[FreeTextEntry1] : 6-17-25  It was a pleasure meeting Kenneth in follow-up today. His respiratory issues are summarized:  1. Pulmonary nodules CT done on 6/4/25 reviewed, there is some groundglass noted in the subpleural areas more in the left as compared to the right. There is an opacity in the left lower lobe  in the subpleural area. However, as we go more distally and at the bases it becomes prominent especially on the right side.  Images were compared to the CT of Chest from 3/2025. The volume of the nodule from 3/5/25 was at 325 cubic mm, in 6/4/25 the volume is essentially unchanged at 200 cubic mm.  This nodule has not increased in size, it is only a 3 months period, however, it is in the area of reticulation in the right lower lobe. The presence of reticulation may increase the risk of the nodule to be malignant.  Shared decision making, discussed the option of biomarker testing, considered waiting for three months, and repeating the CT scan to evaluate for changes, or getting a CT/PET scan now to evaluate for malignancy.  If it a PET positive we recommend a needle biopsy to evaluate further. Patient would like to go forward with PET/CT scan study at this time.   Plan:  -PET/CT - if PET/CT positive, then will get a needle biopsy to rule out malignancy  2. DOMENICO Kenneth has ground glass opacity that is present in the subpleural area. 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 It occupies greater than 5% zone of the lung. There is no history of familial pulmonary fibrosis, collagen vascular disease or occupations exposure to increase risk of interstitial lung disease The incidence of DOMENICO is significantly increased. We will therefore get a PET/CT scan plus minus needle biopsy. We will continue to monitor DOMENICO, with CT scan every 6 to 12 months, and PFT every 3-6 months. We will follow up serologies for collagen vascular disease annually. Should there be development of symptoms, significant progression on CT scan greater that 10% in lung parenchyma or there is a drop in the forced vital capacity, diffusion capacity or six-minute walk distance, we will consider adding antifibrotics. At this stage patient is asymptomatic, I will hold off on starting antifibrotic medication. Aspect to look for on the CT scan is the development of traction bronchial bronchiectasis. This has been shown to correlate with increased mortality.  All of these are factors that may prompt us to consider antifibrotic products.  3. Evaluate for obstructive sleep apnea Patient completed an in lab sleep study on 2/24/25, AHI 1.9, Wally 92%, no treatment warranted at this time.  RTC in 3 month (PFT/6MW prior)  Return to clinic: 3 months